# Patient Record
Sex: FEMALE | Race: WHITE | NOT HISPANIC OR LATINO
[De-identification: names, ages, dates, MRNs, and addresses within clinical notes are randomized per-mention and may not be internally consistent; named-entity substitution may affect disease eponyms.]

---

## 2017-11-30 ENCOUNTER — APPOINTMENT (OUTPATIENT)
Dept: SURGERY | Facility: CLINIC | Age: 22
End: 2017-11-30
Payer: COMMERCIAL

## 2017-11-30 DIAGNOSIS — R22.1 LOCALIZED SWELLING, MASS AND LUMP, NECK: ICD-10-CM

## 2017-11-30 PROCEDURE — 99213 OFFICE O/P EST LOW 20 MIN: CPT

## 2018-01-02 ENCOUNTER — OTHER (OUTPATIENT)
Age: 23
End: 2018-01-02

## 2018-05-16 ENCOUNTER — APPOINTMENT (OUTPATIENT)
Dept: GASTROENTEROLOGY | Facility: CLINIC | Age: 23
End: 2018-05-16

## 2021-06-18 ENCOUNTER — TRANSCRIPTION ENCOUNTER (OUTPATIENT)
Age: 26
End: 2021-06-18

## 2021-06-18 ENCOUNTER — EMERGENCY (EMERGENCY)
Facility: HOSPITAL | Age: 26
LOS: 1 days | Discharge: HOME | End: 2021-06-18
Attending: EMERGENCY MEDICINE
Payer: COMMERCIAL

## 2021-06-18 VITALS
HEART RATE: 88 BPM | TEMPERATURE: 98 F | SYSTOLIC BLOOD PRESSURE: 122 MMHG | OXYGEN SATURATION: 99 % | DIASTOLIC BLOOD PRESSURE: 77 MMHG | RESPIRATION RATE: 18 BRPM

## 2021-06-18 VITALS
WEIGHT: 190.04 LBS | DIASTOLIC BLOOD PRESSURE: 81 MMHG | TEMPERATURE: 98 F | HEART RATE: 90 BPM | HEIGHT: 63 IN | OXYGEN SATURATION: 98 % | SYSTOLIC BLOOD PRESSURE: 115 MMHG | RESPIRATION RATE: 18 BRPM

## 2021-06-18 DIAGNOSIS — Z90.89 ACQUIRED ABSENCE OF OTHER ORGANS: Chronic | ICD-10-CM

## 2021-06-18 LAB
ALBUMIN SERPL ELPH-MCNC: 4.8 G/DL — SIGNIFICANT CHANGE UP (ref 3.3–5)
ALP SERPL-CCNC: 62 U/L — SIGNIFICANT CHANGE UP (ref 40–120)
ALT FLD-CCNC: 33 U/L — SIGNIFICANT CHANGE UP (ref 10–45)
ANION GAP SERPL CALC-SCNC: 13 MMOL/L — SIGNIFICANT CHANGE UP (ref 5–17)
AST SERPL-CCNC: 27 U/L — SIGNIFICANT CHANGE UP (ref 10–40)
BASE EXCESS BLDV CALC-SCNC: 1.9 MMOL/L — SIGNIFICANT CHANGE UP (ref -2–2)
BASOPHILS # BLD AUTO: 0.04 K/UL — SIGNIFICANT CHANGE UP (ref 0–0.2)
BASOPHILS # BLD AUTO: 0.04 K/UL — SIGNIFICANT CHANGE UP (ref 0–0.2)
BASOPHILS NFR BLD AUTO: 0.5 % — SIGNIFICANT CHANGE UP (ref 0–2)
BASOPHILS NFR BLD AUTO: 0.6 % — SIGNIFICANT CHANGE UP (ref 0–2)
BILIRUB SERPL-MCNC: 0.6 MG/DL — SIGNIFICANT CHANGE UP (ref 0.2–1.2)
BUN SERPL-MCNC: 14 MG/DL — SIGNIFICANT CHANGE UP (ref 7–23)
CA-I SERPL-SCNC: 1.19 MMOL/L — SIGNIFICANT CHANGE UP (ref 1.12–1.3)
CALCIUM SERPL-MCNC: 9.9 MG/DL — SIGNIFICANT CHANGE UP (ref 8.4–10.5)
CHLORIDE BLDV-SCNC: 107 MMOL/L — SIGNIFICANT CHANGE UP (ref 96–108)
CHLORIDE SERPL-SCNC: 103 MMOL/L — SIGNIFICANT CHANGE UP (ref 96–108)
CO2 BLDV-SCNC: 30 MMOL/L — SIGNIFICANT CHANGE UP (ref 22–30)
CO2 SERPL-SCNC: 24 MMOL/L — SIGNIFICANT CHANGE UP (ref 22–31)
CREAT SERPL-MCNC: 0.71 MG/DL — SIGNIFICANT CHANGE UP (ref 0.5–1.3)
EOSINOPHIL # BLD AUTO: 0.14 K/UL — SIGNIFICANT CHANGE UP (ref 0–0.5)
EOSINOPHIL # BLD AUTO: 0.17 K/UL — SIGNIFICANT CHANGE UP (ref 0–0.5)
EOSINOPHIL NFR BLD AUTO: 1.9 % — SIGNIFICANT CHANGE UP (ref 0–6)
EOSINOPHIL NFR BLD AUTO: 2.5 % — SIGNIFICANT CHANGE UP (ref 0–6)
GAS PNL BLDV: 138 MMOL/L — SIGNIFICANT CHANGE UP (ref 135–145)
GAS PNL BLDV: SIGNIFICANT CHANGE UP
GAS PNL BLDV: SIGNIFICANT CHANGE UP
GLUCOSE BLDV-MCNC: 102 MG/DL — HIGH (ref 70–99)
GLUCOSE SERPL-MCNC: 103 MG/DL — HIGH (ref 70–99)
HCG SERPL-ACNC: <2 MIU/ML — SIGNIFICANT CHANGE UP
HCO3 BLDV-SCNC: 28 MMOL/L — SIGNIFICANT CHANGE UP (ref 21–29)
HCT VFR BLD CALC: 37.8 % — SIGNIFICANT CHANGE UP (ref 34.5–45)
HCT VFR BLD CALC: 42.7 % — SIGNIFICANT CHANGE UP (ref 34.5–45)
HCT VFR BLDA CALC: 47 % — SIGNIFICANT CHANGE UP (ref 39–50)
HGB BLD CALC-MCNC: 15.3 G/DL — SIGNIFICANT CHANGE UP (ref 11.5–15.5)
HGB BLD-MCNC: 12.8 G/DL — SIGNIFICANT CHANGE UP (ref 11.5–15.5)
HGB BLD-MCNC: 14.4 G/DL — SIGNIFICANT CHANGE UP (ref 11.5–15.5)
IMM GRANULOCYTES NFR BLD AUTO: 0.3 % — SIGNIFICANT CHANGE UP (ref 0–1.5)
IMM GRANULOCYTES NFR BLD AUTO: 0.4 % — SIGNIFICANT CHANGE UP (ref 0–1.5)
LACTATE BLDV-MCNC: 1.5 MMOL/L — SIGNIFICANT CHANGE UP (ref 0.7–2)
LIDOCAIN IGE QN: 19 U/L — SIGNIFICANT CHANGE UP (ref 7–60)
LYMPHOCYTES # BLD AUTO: 1.77 K/UL — SIGNIFICANT CHANGE UP (ref 1–3.3)
LYMPHOCYTES # BLD AUTO: 1.82 K/UL — SIGNIFICANT CHANGE UP (ref 1–3.3)
LYMPHOCYTES # BLD AUTO: 23.7 % — SIGNIFICANT CHANGE UP (ref 13–44)
LYMPHOCYTES # BLD AUTO: 26.5 % — SIGNIFICANT CHANGE UP (ref 13–44)
MCHC RBC-ENTMCNC: 29.2 PG — SIGNIFICANT CHANGE UP (ref 27–34)
MCHC RBC-ENTMCNC: 29.4 PG — SIGNIFICANT CHANGE UP (ref 27–34)
MCHC RBC-ENTMCNC: 33.7 GM/DL — SIGNIFICANT CHANGE UP (ref 32–36)
MCHC RBC-ENTMCNC: 33.9 GM/DL — SIGNIFICANT CHANGE UP (ref 32–36)
MCV RBC AUTO: 86.6 FL — SIGNIFICANT CHANGE UP (ref 80–100)
MCV RBC AUTO: 86.7 FL — SIGNIFICANT CHANGE UP (ref 80–100)
MONOCYTES # BLD AUTO: 0.42 K/UL — SIGNIFICANT CHANGE UP (ref 0–0.9)
MONOCYTES # BLD AUTO: 0.59 K/UL — SIGNIFICANT CHANGE UP (ref 0–0.9)
MONOCYTES NFR BLD AUTO: 6.1 % — SIGNIFICANT CHANGE UP (ref 2–14)
MONOCYTES NFR BLD AUTO: 7.9 % — SIGNIFICANT CHANGE UP (ref 2–14)
NEUTROPHILS # BLD AUTO: 4.4 K/UL — SIGNIFICANT CHANGE UP (ref 1.8–7.4)
NEUTROPHILS # BLD AUTO: 4.91 K/UL — SIGNIFICANT CHANGE UP (ref 1.8–7.4)
NEUTROPHILS NFR BLD AUTO: 64 % — SIGNIFICANT CHANGE UP (ref 43–77)
NEUTROPHILS NFR BLD AUTO: 65.6 % — SIGNIFICANT CHANGE UP (ref 43–77)
NRBC # BLD: 0 /100 WBCS — SIGNIFICANT CHANGE UP (ref 0–0)
NRBC # BLD: 0 /100 WBCS — SIGNIFICANT CHANGE UP (ref 0–0)
OB PNL STL: NEGATIVE — SIGNIFICANT CHANGE UP
PCO2 BLDV: 54 MMHG — HIGH (ref 35–50)
PH BLDV: 7.34 — LOW (ref 7.35–7.45)
PLATELET # BLD AUTO: 220 K/UL — SIGNIFICANT CHANGE UP (ref 150–400)
PLATELET # BLD AUTO: 277 K/UL — SIGNIFICANT CHANGE UP (ref 150–400)
PO2 BLDV: 32 MMHG — SIGNIFICANT CHANGE UP (ref 25–45)
POTASSIUM BLDV-SCNC: 4.3 MMOL/L — SIGNIFICANT CHANGE UP (ref 3.5–5.3)
POTASSIUM SERPL-MCNC: 4.6 MMOL/L — SIGNIFICANT CHANGE UP (ref 3.5–5.3)
POTASSIUM SERPL-SCNC: 4.6 MMOL/L — SIGNIFICANT CHANGE UP (ref 3.5–5.3)
PROT SERPL-MCNC: 8.5 G/DL — HIGH (ref 6–8.3)
RBC # BLD: 4.36 M/UL — SIGNIFICANT CHANGE UP (ref 3.8–5.2)
RBC # BLD: 4.93 M/UL — SIGNIFICANT CHANGE UP (ref 3.8–5.2)
RBC # FLD: 11.8 % — SIGNIFICANT CHANGE UP (ref 10.3–14.5)
RBC # FLD: 11.9 % — SIGNIFICANT CHANGE UP (ref 10.3–14.5)
SAO2 % BLDV: 55 % — LOW (ref 67–88)
SODIUM SERPL-SCNC: 140 MMOL/L — SIGNIFICANT CHANGE UP (ref 135–145)
WBC # BLD: 6.87 K/UL — SIGNIFICANT CHANGE UP (ref 3.8–10.5)
WBC # BLD: 7.48 K/UL — SIGNIFICANT CHANGE UP (ref 3.8–10.5)
WBC # FLD AUTO: 6.87 K/UL — SIGNIFICANT CHANGE UP (ref 3.8–10.5)
WBC # FLD AUTO: 7.48 K/UL — SIGNIFICANT CHANGE UP (ref 3.8–10.5)

## 2021-06-18 PROCEDURE — 96374 THER/PROPH/DIAG INJ IV PUSH: CPT

## 2021-06-18 PROCEDURE — 99284 EMERGENCY DEPT VISIT MOD MDM: CPT | Mod: 25

## 2021-06-18 PROCEDURE — 82272 OCCULT BLD FECES 1-3 TESTS: CPT

## 2021-06-18 PROCEDURE — 85025 COMPLETE CBC W/AUTO DIFF WBC: CPT

## 2021-06-18 PROCEDURE — 96361 HYDRATE IV INFUSION ADD-ON: CPT

## 2021-06-18 PROCEDURE — 83605 ASSAY OF LACTIC ACID: CPT

## 2021-06-18 PROCEDURE — 85014 HEMATOCRIT: CPT

## 2021-06-18 PROCEDURE — 84132 ASSAY OF SERUM POTASSIUM: CPT

## 2021-06-18 PROCEDURE — 85018 HEMOGLOBIN: CPT

## 2021-06-18 PROCEDURE — 82435 ASSAY OF BLOOD CHLORIDE: CPT

## 2021-06-18 PROCEDURE — 84702 CHORIONIC GONADOTROPIN TEST: CPT

## 2021-06-18 PROCEDURE — 99283 EMERGENCY DEPT VISIT LOW MDM: CPT

## 2021-06-18 PROCEDURE — 83690 ASSAY OF LIPASE: CPT

## 2021-06-18 PROCEDURE — 99284 EMERGENCY DEPT VISIT MOD MDM: CPT

## 2021-06-18 PROCEDURE — 82803 BLOOD GASES ANY COMBINATION: CPT

## 2021-06-18 PROCEDURE — 82947 ASSAY GLUCOSE BLOOD QUANT: CPT

## 2021-06-18 PROCEDURE — 82330 ASSAY OF CALCIUM: CPT

## 2021-06-18 PROCEDURE — 80053 COMPREHEN METABOLIC PANEL: CPT

## 2021-06-18 PROCEDURE — 84295 ASSAY OF SERUM SODIUM: CPT

## 2021-06-18 RX ORDER — FAMOTIDINE 10 MG/ML
20 INJECTION INTRAVENOUS ONCE
Refills: 0 | Status: COMPLETED | OUTPATIENT
Start: 2021-06-18 | End: 2021-06-18

## 2021-06-18 RX ORDER — SODIUM CHLORIDE 9 MG/ML
1000 INJECTION INTRAMUSCULAR; INTRAVENOUS; SUBCUTANEOUS ONCE
Refills: 0 | Status: COMPLETED | OUTPATIENT
Start: 2021-06-18 | End: 2021-06-18

## 2021-06-18 RX ADMIN — SODIUM CHLORIDE 1000 MILLILITER(S): 9 INJECTION INTRAMUSCULAR; INTRAVENOUS; SUBCUTANEOUS at 11:18

## 2021-06-18 RX ADMIN — FAMOTIDINE 20 MILLIGRAM(S): 10 INJECTION INTRAVENOUS at 11:18

## 2021-06-18 RX ADMIN — SODIUM CHLORIDE 1000 MILLILITER(S): 9 INJECTION INTRAMUSCULAR; INTRAVENOUS; SUBCUTANEOUS at 14:48

## 2021-06-18 NOTE — ED PROVIDER NOTE - OBJECTIVE STATEMENT
25y F w/ PMHx external hemorrhoids, hx of c.diff colitis presenting with multiple episodes of BRBPR that started last night. Patient states she woke up from sleep with some abdominal discomfort, noticed that she was passing bright red blood with clots. Patient states last bloody bowel movement was 2 hours ago. Denies previous hx of similar symptoms in the past. States she did have an episode of dark stool years ago, and was sent for an endoscopy/colonoscopy with Dr. Burns. Endorses her brother has been diagnosed with IBD, but no other family hx. Denies recent travel, changes to diet, not on AC, denies chronic NSAID use, as well as fever, chills, dizziness, palpitations, cp, sob, n/v, dysuria, hematuria. Patient states she is on Pepcid daily, missed her AM dose.

## 2021-06-18 NOTE — CONSULT NOTE ADULT - SUBJECTIVE AND OBJECTIVE BOX
Patient is a 25y old  Female who presents with a chief complaint of     HPI:  25y F w/ PMHx external hemorrhoids, hx of c.diff colitis presenting with acute onset of upper abdominal pain followed by 1 episode of watery/loose stool then multiple episodes of BRBPR (pt showed pictures taken on her phone - mucosal slough and blood, no clots, no melena) that started last night. Patient states last bloody bowel movement was 2 hours ago. No further abdominal discomfort. Denies previous hx of similar symptoms/rectal bleeding in the past. States she did have an episode of dark stool years ago, and was sent for an endoscopy/colonoscopy with Dr. Burns (~2016)  EGD +significant gastritis with erosion, previous colonoscopy (one in past) without colitis, polyps or lesions +hemorrhoids.     +FH IBD - brother with UC dx 5-6 years ago (brother is currently 31 yo) and grandparent with +colon CA. Denies recent travel, abx exposure, changes to diet, not on AC, denies chronic NSAID use, as well as fever, chills, dizziness, palpitations, cp, sob, n/v, dysuria, hematuria. No recent weight loss and baseline BMs daily - brown. Has lactose intolerance so reports loose brown stools and bloating with dairy intake.  Patient states she is on Pepcid daily, missed her AM dose.    VS and HD stable in ED, no diarrhea or rectal bleeding since prior to presentation in ER (~2 hours ago). FOBT negative in ER. currently no c/o    PAST MEDICAL & SURGICAL HISTORY:  Hemorrhoids, external  S/P tonsillectomy  s/p ovarian torsion (laparoscopic surgery)  +lactose intolerance  +GERD/Gastritis    Allergies  No Known Allergies        MEDICATIONS  (STANDING):    MEDICATIONS  (PRN):      Social History:    engaged  no tobacco   social ETOH    Family History   IBD ( X ) Yes - Brother UC dx ~5 years ago (age 26-27 at time of diagnosis)  (  ) No  GI Malignancy (X  )  Yes grandparent +colon CA    (  ) No    Health Management  Last Colonoscopy - >5 years ago no colitis +hemorrhoids      Advanced Directives: (  X   ) None    (      ) DNR    (     ) DNI    (     ) Health Care Proxy:     Review of Systems:    General:  No wt loss, fevers, chills, night sweats, fatigue  CV:  No pain, palpitatioins, hypo/hypertension  Resp:  No dyspnea, cough, tachypnea, wheezing  GI:  see HPI  :  No pain, bleeding, incontinence, nocturia +hx ovarian torsion (left) s/p laparoscopy  Muscle:  No pain, weakness  Neuro:  No weakness, tingling, memory problems  Psych:  No fatigue, insomnia, mood problems, depression  Endocrine:  No polyuria, polydypsia, cold/heat intolerance  Heme:  No petechiae, ecchymosis, easy bruisability  Skin:  No rash, tattoos, scars, edema      Vital Signs Last 24 Hrs  T(C): 36.7 (18 Jun 2021 11:22), Max: 36.7 (18 Jun 2021 10:29)  T(F): 98 (18 Jun 2021 11:22), Max: 98.1 (18 Jun 2021 10:29)  HR: 90 (18 Jun 2021 11:22) (90 - 93)  BP: 134/90 (18 Jun 2021 11:22) (115/81 - 137/93)  BP(mean): --  RR: 18 (18 Jun 2021 11:22) (18 - 20)  SpO2: 99% (18 Jun 2021 11:22) (98% - 99%)    PHYSICAL EXAM:    Constitutional: NAD, well-developed pleasant non toxic appearing sitting up in stretcher.  sister at bedside  Neck: No LAD, supple no JVD  Respiratory: Clear b/l  Cardiovascular: S1 and S2, RRR  Gastrointestinal: BS+, soft, obese NT/ND, neg HSM, no rebound guarding or rigidity  WH suprapubic (L) scar   Extremities: No peripheral edema, neg clubbing, cyanosis  Vascular: 2+ peripheral pulses  Neurological: A/O x 3, no focal deficits  Psychiatric: Normal mood, normal affect  Skin: No rashes, no pallor        LABS:                        14.4   7.48  )-----------( 277      ( 18 Jun 2021 11:17 )             42.7     06-18    140  |  103  |  14  ----------------------------<  103<H>  4.6   |  24  |  0.71    Ca    9.9      18 Jun 2021 11:17    TPro  8.5<H>  /  Alb  4.8  /  TBili  0.6  /  DBili  x   /  AST  27  /  ALT  33  /  AlkPhos  62  06-18    Occult Blood, Feces (06.18.21 @ 11:18)   Occult Blood, Feces: Negative     Blood Gas Venous - Lactate: 1.5 mmoL/L (06.18.21 @ 11:17    RADIOLOGY & ADDITIONAL TESTS:

## 2021-06-18 NOTE — ED PROVIDER NOTE - ATTENDING CONTRIBUTION TO CARE
Attending MD Echevarria:  I personally have seen and examined this patient.  Resident note reviewed and agree on plan of care and except where noted.

## 2021-06-18 NOTE — ED PROVIDER NOTE - PATIENT PORTAL LINK FT
You can access the FollowMyHealth Patient Portal offered by [facility] by registering at the following website: [email or web address]. By joining [facility]’s FollowMyHealth portal, you will also be able to view your health information using other applications (apps) compatible with our system.

## 2021-06-18 NOTE — CONSULT NOTE ADULT - ATTENDING COMMENTS
Pt. is a 25y F w/ PMHx external hemorrhoids, lactose intolerance, hx of c.diff colitis presenting with acute onset of upper abdominal pain followed by 1 episode of watery/loose stool then multiple episodes of BRBPR (pt showed pictures taken on her phone - mucosal slough and blood, no clots, no melena) that started last night. Patient states last bloody bowel movement was 2 hours ago    Currently without active bleeding or HD instability and with benign abdominal exam.    Planned for op follow up.    Morgan De Jesus MD  [facility]

## 2021-06-18 NOTE — ED ADULT NURSE NOTE - OBJECTIVE STATEMENT
Patient   is  alert  and  oriented x3. Color is good  and  skin warm to touch. She is  c/o  mid abdominal pain and  bloody  stools.  She  denies  nausea or  vomiting.

## 2021-06-18 NOTE — ED PROVIDER NOTE - PROGRESS NOTE DETAILS
ED SIgn Out, eval for abd pain/hematochezia, pending close reassessments, PO. dc w/ close outpt fu -- Javier Rose MD Shubham ALSTON (PGY-2): Return precautions discussed with patient, patient has appt with Dr. Burns on Monday. Will discharge with a copy of her results. Addressed all patient's questions and concerns at this time. Medically stable for discharge

## 2021-06-18 NOTE — CONSULT NOTE ADULT - ASSESSMENT
25y F w/ PMHx external hemorrhoids, lactose intolerance, hx of c.diff colitis presenting with acute onset of upper abdominal pain followed by 1 episode of watery/loose stool then multiple episodes of BRBPR (pt showed pictures taken on her phone - mucosal slough and blood, no clots, no melena) that started last night. Patient states last bloody bowel movement was 2 hours ago    Currently without active bleeding or HD instability and with benign abdominal exam.    Rectal Bleeding - given clinical history and +FH IBD, suspicious for acute presentation for IBD ?UC vs hemorrhoidal bleed.    -given benign abdominal exam and HD stability and no leukocytosis and no diarrhea/further bleeding, no need for CT scan  -recommend LRD lactose free diet  -will need colonoscopy to evaluate.  Scheduled for Colonoscopy 7/1 @2pm with Dr Ino Talbot [phone]    No GI objection to discharge with outpt follow up as outlined above  Discussed with Dr Talbot  ER physicians updated  Discussed with pt, all questions answered    Thank you for the courtesy of this consult.    Jorge Luis Luu PA-C    [facility]  [phone]  After hours and weekend coverage [phone]

## 2021-06-18 NOTE — ED PROVIDER NOTE - NS ED ROS FT
CONSTITUTIONAL: No fevers, no chills, no lightheadedness, no dizziness  EYES: no visual changes  MOUTH/THROAT: no sore throat, no mucosal sores   CV: No chest pain, no palpitations  RESP: No SOB, no cough  GI: +bloody diarrhea, no n/v, no abd pain  : no dysuria, no hematuria, no flank pain  MSK: no back pain, no extremity pain  SKIN: no rashes  NEURO: no headache, no focal weakness, no decreased sensation/paresthesias   PSYCHIATRIC: no known mental health issues

## 2021-06-18 NOTE — ED ADULT NURSE NOTE - ED CARDIAC HEART SOUNDS
7/10. Ex's above completed with verbal and demo cueing to complete with accurate form. Pt able to complete all ex this date. Iontophoresis 4mg/ml Dexamethasone sodium phosphate dosage  mAmp Min administered to decrease swelling and pain/numbness and tingling of L wrist/hand. Pt tolerated 4.0mA current for 14.2 minutes. Pt reported decreased numbness and tingling after iontophoresis and ultrasound of 4/10. Specific Instructions for next treatment:    Treatment Charges: Mins Units   [x]  Modalities:  Ultrasound   8 1   []  Ther Exercise     []  Manual Therapy     []  Ther Activities     [x]  Other IONTO 15 1     Assessment: [x] Progressing toward goals. [] No change. [] Other:    Short Term Goals: (  6  Treatments)  1. Decrease Pain: decrease pain to 4/10 with simple adl tasks  2. Increase strength (pounds): increase L  strength by 10 pounds for increased I at work  3. Increase function:  DASH score will be 24% functionally impaired or less  4. Independent with Home Exercise Program in 3 sessions        Long Term Goals: ( 12 Treatments)  1. Pt to demo increased awareness of proper hand positioning during daily activities to decrease risk of aggravating median nerve (wrist in neutral). 2. Pt to increase L  strength to 55 for increased I with work related tasks. Pt to tolerate       Patient Goals: Decrease numb and tingles      Pt. Education:  [x] Yes  [] No  [] Reviewed Prior HEP/Ed  Method of Education: [x] Verbal  [x] Demo  [x] Written   Comprehension of Education:  [x] Verbalizes understanding. [x] Demonstrates understanding. [] Needs review. [] Demonstrates/verbalizes HEP/Ed previously given. Plan: Continue with current plan of exercise, massage, ultrasound, iontophoresis to reduce pain and fatigue L hand and wrist.      Time In/Out:3452-0432   Total Treatment Time:  36  Min.       Electronically signed by:  ELIF House/RACHAEL
normal S1, S2 heard

## 2021-06-18 NOTE — ED PROVIDER NOTE - NSFOLLOWUPINSTRUCTIONS_ED_ALL_ED_FT
- Lab and imaging results, if performed, were discussed with you along with your discharge diagnosis    - Follow up with your Gastroenterologist on Monday. Please bring a copy of your results from today with you    - Return to the ED for any new, worsening, or concerning symptoms to you, including worsening GI bleeding, new dizziness, vomiting, or palpitations     - Continue all prescribed medications    - Rest and keep yourself hydrated with fluids

## 2021-06-18 NOTE — ED PROVIDER NOTE - CLINICAL SUMMARY MEDICAL DECISION MAKING FREE TEXT BOX
25y F w/ PMHx external hemorrhoids, hx of c.diff colitis presenting with multiple episodes of BRBPR that started last night. VS WNL, patient without acute BRBPR on arrival. Abd qian, NT. Will obtain bas ic labs to assess for electrolyte derangements and anemia. Will treat w/ Pepcid as pt missed her AM dose, trend rpt 4h CBC. 25y F w/ PMHx external hemorrhoids, hx of c.diff colitis presenting with multiple episodes of BRBPR that started last night. VS WNL, patient without acute BRBPR on arrival. Abd soft, NT. Will obtain bas ic labs to assess for electrolyte derangements and anemia. Will treat w/ Pepcid as pt missed her AM dose, trend rpt 4h CBC.    Attending MD Echevarria: 26 yo female with remote hx of c.diff, external hemorrhoids, and gastritis present with BPBPR x 10 episodes since 11pm last night.  States like bloody diarrhea associated with epigastric abdominal pain.  Pain relieved with the diarrhea.  Last episode 2 hours prior to presentation.  Brother with hx of Ulcerative Colitis.  Patient on no blood thinners; last EGD/colonoscopy several years ago.  Denies significant alcohol or NSAID use. No other drug use.   On exam the abd is soft NTND; rectal with no visible hemorrhoids and no pain on digital exam.  No active bleeding, no blood on glove.  Plan:  likely colitis vs inflammatory bowel disease vs diverticulosis.  labs, orthostatics; stool guaiac; q 4 CBC; lactate and re-eval.  Possible CDU vs early GI follow up. 25y F w/ PMHx external hemorrhoids, hx of c.diff colitis presenting with multiple episodes of BRBPR that started last night. VS WNL, patient without acute BRBPR on arrival. Abd soft, NT. Dx includes IDB, colitis, diverticulosis. Will obtain basic labs to assess for electrolyte derangements and anemia. Will treat w/ Pepcid as pt missed her AM dose, trend rpt 4h CBC. Attg performed rectal exam, no BRBPR, no external or internal hemorrhoids appreciated. Will need rapid GI referral.     Attending MD Echevarria: 26 yo female with remote hx of c.diff, external hemorrhoids, and gastritis present with BPBPR x 10 episodes since 11pm last night.  States like bloody diarrhea associated with epigastric abdominal pain.  Pain relieved with the diarrhea.  Last episode 2 hours prior to presentation.  Brother with hx of Ulcerative Colitis.  Patient on no blood thinners; last EGD/colonoscopy several years ago.  Denies significant alcohol or NSAID use. No other drug use.   On exam the abd is soft NTND; rectal with no visible hemorrhoids and no pain on digital exam.  No active bleeding, no blood on glove.  Plan:  likely colitis vs inflammatory bowel disease vs diverticulosis.  labs, orthostatics; stool guaiac; q 4 CBC; lactate and re-eval.  Possible CDU vs early GI follow up.

## 2021-06-18 NOTE — ED ADULT NURSE NOTE - NSIMPLEMENTINTERV_GEN_ALL_ED
Implemented All Universal Safety Interventions:  [place] to call system. Call bell, personal items and telephone within reach. Instruct patient to call for assistance. Room bathroom lighting operational. Non-slip footwear when patient is off stretcher. Physically safe environment: no spills, clutter or unnecessary equipment. Stretcher in lowest position, wheels locked, appropriate side rails in place.

## 2022-07-22 NOTE — ED ADULT NURSE NOTE - NS ED NURSE LEVEL OF CONSCIOUSNESS MENTAL STATUS
Awake Griseofulvin Counseling:  I discussed with the patient the risks of griseofulvin including but not limited to photosensitivity, cytopenia, liver damage, nausea/vomiting and severe allergy.  The patient understands that this medication is best absorbed when taken with a fatty meal (e.g., ice cream or french fries).

## 2023-07-25 NOTE — ED ADULT NURSE NOTE - PRIMARY CARE PROVIDER
Problem: MOBILITY - ADULT  Goal: Maintain or return to baseline ADL function  Description: INTERVENTIONS:  -  Assess patient's ability to carry out ADLs; assess patient's baseline for ADL function and identify physical deficits which impact ability to perform ADLs (bathing, care of mouth/teeth, toileting, grooming, dressing, etc.)  - Assess/evaluate cause of self-care deficits   - Assess range of motion  - Assess patient's mobility; develop plan if impaired  - Assess patient's need for assistive devices and provide as appropriate  - Encourage maximum independence but intervene and supervise when necessary  - Involve family in performance of ADLs  - Assess for home care needs following discharge   - Consider OT consult to assist with ADL evaluation and planning for discharge  - Provide patient education as appropriate  Outcome: Progressing  Goal: Maintains/Returns to pre admission functional level  Description: INTERVENTIONS:  - Perform BMAT or MOVE assessment daily.   - Set and communicate daily mobility goal to care team and patient/family/caregiver. - Collaborate with rehabilitation services on mobility goals if consulted  - Perform Range of Motion 3 times a day. - Reposition patient every 2 hours.   - Dangle patient 3 times a day  - Stand patient 3 times a day  - Ambulate patient 3 times a day  - Out of bed to chair 3 times a day   - Out of bed for meals 3 times a day  - Out of bed for toileting  - Record patient progress and toleration of activity level   Outcome: Progressing     Problem: PAIN - ADULT  Goal: Verbalizes/displays adequate comfort level or baseline comfort level  Description: Interventions:  - Encourage patient to monitor pain and request assistance  - Assess pain using appropriate pain scale  - Administer analgesics based on type and severity of pain and evaluate response  - Implement non-pharmacological measures as appropriate and evaluate response  - Consider cultural and social influences on pain and pain management  - Notify physician/advanced practitioner if interventions unsuccessful or patient reports new pain  Outcome: Progressing     Problem: INFECTION - ADULT  Goal: Absence or prevention of progression during hospitalization  Description: INTERVENTIONS:  - Assess and monitor for signs and symptoms of infection  - Monitor lab/diagnostic results  - Monitor all insertion sites, i.e. indwelling lines, tubes, and drains  - Monitor endotracheal if appropriate and nasal secretions for changes in amount and color  - Rickreall appropriate cooling/warming therapies per order  - Administer medications as ordered  - Instruct and encourage patient and family to use good hand hygiene technique  - Identify and instruct in appropriate isolation precautions for identified infection/condition  Outcome: Progressing     Problem: RESPIRATORY - ADULT  Goal: Achieves optimal ventilation and oxygenation  Description: INTERVENTIONS:  - Assess for changes in respiratory status  - Assess for changes in mentation and behavior  - Position to facilitate oxygenation and minimize respiratory effort  - Oxygen administered by appropriate delivery if ordered  - Initiate smoking cessation education as indicated  - Encourage broncho-pulmonary hygiene including cough, deep breathe, Incentive Spirometry  - Assess the need for suctioning and aspirate as needed  - Assess and instruct to report SOB or any respiratory difficulty  - Respiratory Therapy support as indicated  Outcome: Progressing Paulette  [place]